# Patient Record
Sex: MALE | Race: OTHER | HISPANIC OR LATINO | URBAN - METROPOLITAN AREA
[De-identification: names, ages, dates, MRNs, and addresses within clinical notes are randomized per-mention and may not be internally consistent; named-entity substitution may affect disease eponyms.]

---

## 2018-01-28 ENCOUNTER — EMERGENCY (EMERGENCY)
Facility: HOSPITAL | Age: 43
LOS: 1 days | Discharge: HOME | End: 2018-01-28
Attending: EMERGENCY MEDICINE | Admitting: EMERGENCY MEDICINE
Payer: COMMERCIAL

## 2018-01-28 VITALS
HEART RATE: 88 BPM | RESPIRATION RATE: 16 BRPM | DIASTOLIC BLOOD PRESSURE: 78 MMHG | OXYGEN SATURATION: 100 % | SYSTOLIC BLOOD PRESSURE: 145 MMHG

## 2018-01-28 VITALS
OXYGEN SATURATION: 97 % | HEIGHT: 66 IN | WEIGHT: 149.91 LBS | TEMPERATURE: 99 F | RESPIRATION RATE: 20 BRPM | SYSTOLIC BLOOD PRESSURE: 189 MMHG | HEART RATE: 148 BPM | DIASTOLIC BLOOD PRESSURE: 74 MMHG

## 2018-01-28 LAB
ALBUMIN SERPL ELPH-MCNC: 4.6 G/DL — SIGNIFICANT CHANGE UP (ref 3.3–5.2)
ALP SERPL-CCNC: 67 U/L — SIGNIFICANT CHANGE UP (ref 40–120)
ALT FLD-CCNC: 28 U/L — SIGNIFICANT CHANGE UP
AMPHET UR-MCNC: POSITIVE
ANION GAP SERPL CALC-SCNC: 20 MMOL/L — HIGH (ref 5–17)
APTT BLD: 28.7 SEC — SIGNIFICANT CHANGE UP (ref 27.5–37.4)
AST SERPL-CCNC: 24 U/L — SIGNIFICANT CHANGE UP
BARBITURATES UR SCN-MCNC: NEGATIVE — SIGNIFICANT CHANGE UP
BASOPHILS # BLD AUTO: 0 K/UL — SIGNIFICANT CHANGE UP (ref 0–0.2)
BASOPHILS NFR BLD AUTO: 0.2 % — SIGNIFICANT CHANGE UP (ref 0–2)
BENZODIAZ UR-MCNC: NEGATIVE — SIGNIFICANT CHANGE UP
BILIRUB SERPL-MCNC: 0.4 MG/DL — SIGNIFICANT CHANGE UP (ref 0.4–2)
BUN SERPL-MCNC: 16 MG/DL — SIGNIFICANT CHANGE UP (ref 8–20)
CALCIUM SERPL-MCNC: 9.1 MG/DL — SIGNIFICANT CHANGE UP (ref 8.6–10.2)
CHLORIDE SERPL-SCNC: 97 MMOL/L — LOW (ref 98–107)
CK MB CFR SERPL CALC: 3.9 NG/ML — SIGNIFICANT CHANGE UP (ref 0–6.7)
CK SERPL-CCNC: 319 U/L — HIGH (ref 30–200)
CO2 SERPL-SCNC: 22 MMOL/L — SIGNIFICANT CHANGE UP (ref 22–29)
COCAINE METAB.OTHER UR-MCNC: NEGATIVE — SIGNIFICANT CHANGE UP
CREAT SERPL-MCNC: 0.87 MG/DL — SIGNIFICANT CHANGE UP (ref 0.5–1.3)
EOSINOPHIL # BLD AUTO: 0 K/UL — SIGNIFICANT CHANGE UP (ref 0–0.5)
EOSINOPHIL NFR BLD AUTO: 0 % — SIGNIFICANT CHANGE UP (ref 0–5)
GLUCOSE SERPL-MCNC: 145 MG/DL — HIGH (ref 70–115)
HCT VFR BLD CALC: 41.1 % — LOW (ref 42–52)
HGB BLD-MCNC: 14 G/DL — SIGNIFICANT CHANGE UP (ref 14–18)
INR BLD: 1.12 RATIO — SIGNIFICANT CHANGE UP (ref 0.88–1.16)
LYMPHOCYTES # BLD AUTO: 1.5 K/UL — SIGNIFICANT CHANGE UP (ref 1–4.8)
LYMPHOCYTES # BLD AUTO: 11.8 % — LOW (ref 20–55)
MAGNESIUM SERPL-MCNC: 2.2 MG/DL — SIGNIFICANT CHANGE UP (ref 1.6–2.6)
MCHC RBC-ENTMCNC: 31.6 PG — HIGH (ref 27–31)
MCHC RBC-ENTMCNC: 34.1 G/DL — SIGNIFICANT CHANGE UP (ref 32–36)
MCV RBC AUTO: 92.8 FL — SIGNIFICANT CHANGE UP (ref 80–94)
METHADONE UR-MCNC: NEGATIVE — SIGNIFICANT CHANGE UP
MONOCYTES # BLD AUTO: 1.2 K/UL — HIGH (ref 0–0.8)
MONOCYTES NFR BLD AUTO: 9 % — SIGNIFICANT CHANGE UP (ref 3–10)
NEUTROPHILS # BLD AUTO: 10.3 K/UL — HIGH (ref 1.8–8)
NEUTROPHILS NFR BLD AUTO: 78.7 % — HIGH (ref 37–73)
OPIATES UR-MCNC: NEGATIVE — SIGNIFICANT CHANGE UP
PCP SPEC-MCNC: SIGNIFICANT CHANGE UP
PCP UR-MCNC: NEGATIVE — SIGNIFICANT CHANGE UP
PHOSPHATE SERPL-MCNC: 3.6 MG/DL — SIGNIFICANT CHANGE UP (ref 2.4–4.7)
PLATELET # BLD AUTO: 292 K/UL — SIGNIFICANT CHANGE UP (ref 150–400)
POTASSIUM SERPL-MCNC: 3.4 MMOL/L — LOW (ref 3.5–5.3)
POTASSIUM SERPL-SCNC: 3.4 MMOL/L — LOW (ref 3.5–5.3)
PROT SERPL-MCNC: 7.9 G/DL — SIGNIFICANT CHANGE UP (ref 6.6–8.7)
PROTHROM AB SERPL-ACNC: 12.4 SEC — SIGNIFICANT CHANGE UP (ref 9.8–12.7)
RBC # BLD: 4.43 M/UL — LOW (ref 4.6–6.2)
RBC # FLD: 13.2 % — SIGNIFICANT CHANGE UP (ref 11–15.6)
SODIUM SERPL-SCNC: 139 MMOL/L — SIGNIFICANT CHANGE UP (ref 135–145)
THC UR QL: POSITIVE
TROPONIN T SERPL-MCNC: <0.01 NG/ML — SIGNIFICANT CHANGE UP (ref 0–0.06)
WBC # BLD: 13.1 K/UL — HIGH (ref 4.8–10.8)
WBC # FLD AUTO: 13.1 K/UL — HIGH (ref 4.8–10.8)

## 2018-01-28 PROCEDURE — 82550 ASSAY OF CK (CPK): CPT

## 2018-01-28 PROCEDURE — 93005 ELECTROCARDIOGRAM TRACING: CPT

## 2018-01-28 PROCEDURE — 85027 COMPLETE CBC AUTOMATED: CPT

## 2018-01-28 PROCEDURE — 36415 COLL VENOUS BLD VENIPUNCTURE: CPT

## 2018-01-28 PROCEDURE — 99291 CRITICAL CARE FIRST HOUR: CPT | Mod: 25

## 2018-01-28 PROCEDURE — 85610 PROTHROMBIN TIME: CPT

## 2018-01-28 PROCEDURE — 93010 ELECTROCARDIOGRAM REPORT: CPT

## 2018-01-28 PROCEDURE — 99291 CRITICAL CARE FIRST HOUR: CPT

## 2018-01-28 PROCEDURE — 85730 THROMBOPLASTIN TIME PARTIAL: CPT

## 2018-01-28 PROCEDURE — 82553 CREATINE MB FRACTION: CPT

## 2018-01-28 PROCEDURE — 80307 DRUG TEST PRSMV CHEM ANLYZR: CPT

## 2018-01-28 PROCEDURE — 84484 ASSAY OF TROPONIN QUANT: CPT

## 2018-01-28 PROCEDURE — 83735 ASSAY OF MAGNESIUM: CPT

## 2018-01-28 PROCEDURE — 80053 COMPREHEN METABOLIC PANEL: CPT

## 2018-01-28 PROCEDURE — 96374 THER/PROPH/DIAG INJ IV PUSH: CPT

## 2018-01-28 PROCEDURE — 84100 ASSAY OF PHOSPHORUS: CPT

## 2018-01-28 PROCEDURE — 96375 TX/PRO/DX INJ NEW DRUG ADDON: CPT

## 2018-01-28 RX ORDER — ADENOSINE 3 MG/ML
6 INJECTION INTRAVENOUS ONCE
Qty: 0 | Refills: 0 | Status: COMPLETED | OUTPATIENT
Start: 2018-01-28 | End: 2018-01-28

## 2018-01-28 RX ORDER — ADENOSINE 3 MG/ML
12 INJECTION INTRAVENOUS ONCE
Qty: 0 | Refills: 0 | Status: COMPLETED | OUTPATIENT
Start: 2018-01-28 | End: 2018-01-28

## 2018-01-28 RX ORDER — AMIODARONE HYDROCHLORIDE 400 MG/1
150 TABLET ORAL ONCE
Qty: 0 | Refills: 0 | Status: COMPLETED | OUTPATIENT
Start: 2018-01-28 | End: 2018-01-28

## 2018-01-28 RX ADMIN — AMIODARONE HYDROCHLORIDE 618 MILLIGRAM(S): 400 TABLET ORAL at 09:27

## 2018-01-28 RX ADMIN — ADENOSINE 6 MILLIGRAM(S): 3 INJECTION INTRAVENOUS at 09:23

## 2018-01-28 RX ADMIN — ADENOSINE 6 MILLIGRAM(S): 3 INJECTION INTRAVENOUS at 09:27

## 2018-01-28 RX ADMIN — ADENOSINE 6 MILLIGRAM(S): 3 INJECTION INTRAVENOUS at 09:24

## 2018-01-28 NOTE — ED PROVIDER NOTE - NS ED ROS FT
Denies fever, chills, HA, blurry vision, sore throat, coughing, vomiting, abdominal pain, flank pain, diarrhea, constipation, blood in stool, urinary frequency/urgency/dysuria, hematuria, LE edema, numbness, weakness or rashes.

## 2018-01-28 NOTE — ED PROVIDER NOTE - PROGRESS NOTE DETAILS
Patient reassessed and results shared with him and his wife. He states he wants to leave and that "I can't afford to stay here any longer." I explained that we do not charge by the hour & I agreed for no more tests. I encouraged PO hydration s/p 2L IVF, HR decreased to 110, but increases to 120 when I walk into the room and therefore there is likely an anxiety component. Patient agrees for further observation until HR improves below 100. Patient tolerated PO well, feels well, HR improved to 90, BP remains stable and RR and O2 saturation are maintained WNL. Will discharge patient. He verbalizes understanding regarding indications to return to the ED and the importance of proper follow up. He is comfortable with discharge at this time.

## 2018-01-28 NOTE — ED ADULT NURSE NOTE - OBJECTIVE STATEMENT
Patient arrived to ED today with c/o chest pressure, chest pain that started this AM.  Patient states he used pot, drank alcohol, and another drug last night that he is unsure of.

## 2018-01-28 NOTE — ED PROVIDER NOTE - MEDICAL DECISION MAKING DETAILS
Patient presents for tachycardia, in moderate distress, sinus tach vs. SVT noted on the monitor. Two attempts at valsalva did not slow rate. Adenosine given with slowing of HR, but tachycardia returned, albeit slower, showing sinus tachycardia. EKG's obtained. 150 mg of Amiodarone given and IV hydration given. No beta-blocker given as patient ingested unknown substance and in the event it was cocaine, beta-blocker would allow for un-opposed alpha agonism and patient may decompensate. Pads on patient, discussed plan for IV hydration and reassessment. Cardiac enzymes and drug screens sent. Patient presents for tachycardia, in moderate distress, sinus tach vs. SVT noted on the monitor. Two attempts at valsalva did not slow rate. Adenosine given with slowing of HR, but tachycardia returned, albeit slower, showing sinus tachycardia. EKG's obtained. 150 mg of Amiodarone given and IV hydration given. No beta-blocker given as patient ingested unknown substance and in the event it was cocaine, beta-blocker would allow for un-opposed alpha agonism and patient may decompensate. Pads on patient, discussed plan for IV hydration and reassessment. Cardiac enzymes and drug screens sent. Patient's symptoms improved with IV hydration and time. He is asymptomatic after treatment and tolerated PO. He is comfortable with discharge and verbalizes understanding regarding drug use.
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
## 2018-01-28 NOTE — ED PROVIDER NOTE - CARE PLAN
Principal Discharge DX:	Sinus tachycardia Principal Discharge DX:	Sinus tachycardia  Secondary Diagnosis:	Polysubstance abuse

## 2018-01-28 NOTE — ED PROVIDER NOTE - CROS ED NEURO POS
FREE:[LAST:[[place]],PHONE:[(   )    -],FAX:[(   )    -],ADDRESS:[PMD]] + dizziness TOKEN:'[id number]'

## 2018-09-13 ENCOUNTER — EMERGENCY (EMERGENCY)
Facility: HOSPITAL | Age: 43
LOS: 1 days | Discharge: HOME | End: 2018-09-13
Attending: EMERGENCY MEDICINE
Payer: COMMERCIAL

## 2018-09-13 VITALS
OXYGEN SATURATION: 97 % | HEART RATE: 88 BPM | TEMPERATURE: 98 F | SYSTOLIC BLOOD PRESSURE: 143 MMHG | DIASTOLIC BLOOD PRESSURE: 71 MMHG | RESPIRATION RATE: 14 BRPM

## 2018-09-13 VITALS
RESPIRATION RATE: 14 BRPM | TEMPERATURE: 98 F | HEART RATE: 78 BPM | SYSTOLIC BLOOD PRESSURE: 150 MMHG | DIASTOLIC BLOOD PRESSURE: 80 MMHG | HEIGHT: 65 IN | WEIGHT: 149.91 LBS | OXYGEN SATURATION: 100 %

## 2018-09-13 PROBLEM — I10 ESSENTIAL (PRIMARY) HYPERTENSION: Chronic | Status: ACTIVE | Noted: 2018-01-28

## 2018-09-13 PROCEDURE — 72125 CT NECK SPINE W/O DYE: CPT | Mod: 26

## 2018-09-13 PROCEDURE — 73030 X-RAY EXAM OF SHOULDER: CPT | Mod: 26,RT

## 2018-09-13 PROCEDURE — 72170 X-RAY EXAM OF PELVIS: CPT | Mod: 26

## 2018-09-13 PROCEDURE — 99284 EMERGENCY DEPT VISIT MOD MDM: CPT

## 2018-09-13 PROCEDURE — 73030 X-RAY EXAM OF SHOULDER: CPT

## 2018-09-13 PROCEDURE — 72170 X-RAY EXAM OF PELVIS: CPT

## 2018-09-13 PROCEDURE — 71101 X-RAY EXAM UNILAT RIBS/CHEST: CPT

## 2018-09-13 PROCEDURE — 72125 CT NECK SPINE W/O DYE: CPT

## 2018-09-13 PROCEDURE — 71101 X-RAY EXAM UNILAT RIBS/CHEST: CPT | Mod: 26

## 2018-09-13 RX ORDER — IBUPROFEN 200 MG
600 TABLET ORAL ONCE
Qty: 0 | Refills: 0 | Status: COMPLETED | OUTPATIENT
Start: 2018-09-13 | End: 2018-09-13

## 2018-09-13 RX ADMIN — Medication 600 MILLIGRAM(S): at 11:14

## 2018-09-13 RX ADMIN — Medication 600 MILLIGRAM(S): at 11:43
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
## 2018-09-13 NOTE — ED ADULT NURSE NOTE - NSIMPLEMENTINTERV_GEN_ALL_ED
Implemented All Universal Safety Interventions:  [place] to call system. Call bell, personal items and telephone within reach. Instruct patient to call for assistance. Room bathroom lighting operational. Non-slip footwear when patient is off stretcher. Physically safe environment: no spills, clutter or unnecessary equipment. Stretcher in lowest position, wheels locked, appropriate side rails in place.
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
## 2018-09-13 NOTE — ED PROVIDER NOTE - PHYSICAL EXAMINATION
Gen: WNWD NAD  HEENT: NCAT PERRL EOMI normal pharynx  Neck: supple no midline TTP, BL upper trapezius spasm and TTP  CV: RRR, no murmur  Chest: L lateral and posterior chest wall TTP no crepitus  Lung: CTA BL  Abd: +BS soft NTND  Ext: wwp, palp pulses, FROMx4 but L hip pain with flexion, no cce  Neuro: A&Ox3, CN grossly intact, sensation intact, motor 5/5 throughout
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
## 2018-11-24 ENCOUNTER — EMERGENCY (EMERGENCY)
Facility: HOSPITAL | Age: 43
LOS: 1 days | Discharge: HOME | End: 2018-11-24
Attending: EMERGENCY MEDICINE | Admitting: EMERGENCY MEDICINE
Payer: SELF-PAY

## 2018-11-24 VITALS
TEMPERATURE: 98 F | DIASTOLIC BLOOD PRESSURE: 108 MMHG | HEART RATE: 85 BPM | RESPIRATION RATE: 17 BRPM | WEIGHT: 149.91 LBS | OXYGEN SATURATION: 99 % | HEIGHT: 65 IN | SYSTOLIC BLOOD PRESSURE: 148 MMHG

## 2018-11-24 DIAGNOSIS — R07.89 OTHER CHEST PAIN: ICD-10-CM

## 2018-11-24 LAB
ALBUMIN SERPL ELPH-MCNC: 3.9 G/DL — SIGNIFICANT CHANGE UP (ref 3.3–5)
ALP SERPL-CCNC: 90 U/L — SIGNIFICANT CHANGE UP (ref 40–120)
ALT FLD-CCNC: 38 U/L DA — SIGNIFICANT CHANGE UP (ref 10–45)
ANION GAP SERPL CALC-SCNC: 10 MMOL/L — SIGNIFICANT CHANGE UP (ref 5–17)
AST SERPL-CCNC: 22 U/L — SIGNIFICANT CHANGE UP (ref 10–40)
BASOPHILS # BLD AUTO: 0.1 K/UL — SIGNIFICANT CHANGE UP (ref 0–0.2)
BASOPHILS NFR BLD AUTO: 1.6 % — SIGNIFICANT CHANGE UP (ref 0–2)
BILIRUB SERPL-MCNC: 0.3 MG/DL — SIGNIFICANT CHANGE UP (ref 0.2–1.2)
BUN SERPL-MCNC: 21 MG/DL — SIGNIFICANT CHANGE UP (ref 7–23)
CALCIUM SERPL-MCNC: 9.1 MG/DL — SIGNIFICANT CHANGE UP (ref 8.4–10.5)
CHLORIDE SERPL-SCNC: 102 MMOL/L — SIGNIFICANT CHANGE UP (ref 96–108)
CO2 SERPL-SCNC: 28 MMOL/L — SIGNIFICANT CHANGE UP (ref 22–31)
CREAT SERPL-MCNC: 1.02 MG/DL — SIGNIFICANT CHANGE UP (ref 0.5–1.3)
EOSINOPHIL # BLD AUTO: 0.3 K/UL — SIGNIFICANT CHANGE UP (ref 0–0.5)
EOSINOPHIL NFR BLD AUTO: 4.2 % — SIGNIFICANT CHANGE UP (ref 0–6)
GLUCOSE SERPL-MCNC: 113 MG/DL — HIGH (ref 70–99)
HCT VFR BLD CALC: 44 % — SIGNIFICANT CHANGE UP (ref 39–50)
HGB BLD-MCNC: 14.8 G/DL — SIGNIFICANT CHANGE UP (ref 13–17)
LYMPHOCYTES # BLD AUTO: 1.9 K/UL — SIGNIFICANT CHANGE UP (ref 1–3.3)
LYMPHOCYTES # BLD AUTO: 30.3 % — SIGNIFICANT CHANGE UP (ref 13–44)
MCHC RBC-ENTMCNC: 32.2 PG — SIGNIFICANT CHANGE UP (ref 27–34)
MCHC RBC-ENTMCNC: 33.8 GM/DL — SIGNIFICANT CHANGE UP (ref 32–36)
MCV RBC AUTO: 95.3 FL — SIGNIFICANT CHANGE UP (ref 80–100)
MONOCYTES # BLD AUTO: 0.7 K/UL — SIGNIFICANT CHANGE UP (ref 0–0.9)
MONOCYTES NFR BLD AUTO: 10.7 % — HIGH (ref 1–9)
NEUTROPHILS # BLD AUTO: 3.4 K/UL — SIGNIFICANT CHANGE UP (ref 1.8–7.4)
NEUTROPHILS NFR BLD AUTO: 53.2 % — SIGNIFICANT CHANGE UP (ref 43–77)
PLATELET # BLD AUTO: 299 K/UL — SIGNIFICANT CHANGE UP (ref 150–400)
POTASSIUM SERPL-MCNC: 4.4 MMOL/L — SIGNIFICANT CHANGE UP (ref 3.5–5.3)
POTASSIUM SERPL-SCNC: 4.4 MMOL/L — SIGNIFICANT CHANGE UP (ref 3.5–5.3)
PROT SERPL-MCNC: 7.8 G/DL — SIGNIFICANT CHANGE UP (ref 6–8.3)
RBC # BLD: 4.61 M/UL — SIGNIFICANT CHANGE UP (ref 4.2–5.8)
RBC # FLD: 11.6 % — SIGNIFICANT CHANGE UP (ref 10.3–14.5)
SODIUM SERPL-SCNC: 140 MMOL/L — SIGNIFICANT CHANGE UP (ref 135–145)
TROPONIN I SERPL-MCNC: <.017 NG/ML — LOW (ref 0.02–0.06)
TROPONIN I SERPL-MCNC: <.017 NG/ML — LOW (ref 0.02–0.06)
WBC # BLD: 6.3 K/UL — SIGNIFICANT CHANGE UP (ref 3.8–10.5)
WBC # FLD AUTO: 6.3 K/UL — SIGNIFICANT CHANGE UP (ref 3.8–10.5)

## 2018-11-24 PROCEDURE — 99285 EMERGENCY DEPT VISIT HI MDM: CPT

## 2018-11-24 PROCEDURE — 71045 X-RAY EXAM CHEST 1 VIEW: CPT | Mod: 26

## 2018-11-24 PROCEDURE — 85027 COMPLETE CBC AUTOMATED: CPT

## 2018-11-24 PROCEDURE — 93005 ELECTROCARDIOGRAM TRACING: CPT

## 2018-11-24 PROCEDURE — 71045 X-RAY EXAM CHEST 1 VIEW: CPT

## 2018-11-24 PROCEDURE — 99283 EMERGENCY DEPT VISIT LOW MDM: CPT | Mod: 25

## 2018-11-24 PROCEDURE — 93010 ELECTROCARDIOGRAM REPORT: CPT

## 2018-11-24 PROCEDURE — 80053 COMPREHEN METABOLIC PANEL: CPT

## 2018-11-24 PROCEDURE — 84484 ASSAY OF TROPONIN QUANT: CPT

## 2018-11-24 NOTE — ED PROVIDER NOTE - OBJECTIVE STATEMENT
Pertinent PMH/PSH/FHx/SHx and Review of Systems contained within:  42 y/o male with h/o HTN presents to ed c/o left anterior chest pain . pressure like , started 45 minutes ago, while he was cooking

## 2018-11-24 NOTE — ED PROVIDER NOTE - MEDICAL DECISION MAKING DETAILS
pt has non specific chest pain, normal ekg, no risk factor for CAD, 2 set negative,, out pt follow up

## 2018-11-25 VITALS
RESPIRATION RATE: 18 BRPM | HEART RATE: 62 BPM | OXYGEN SATURATION: 99 % | SYSTOLIC BLOOD PRESSURE: 131 MMHG | DIASTOLIC BLOOD PRESSURE: 65 MMHG | TEMPERATURE: 98 F

## 2018-11-25 PROBLEM — I10 ESSENTIAL (PRIMARY) HYPERTENSION: Chronic | Status: ACTIVE | Noted: 2018-09-13

## 2018-11-25 PROBLEM — N40.0 BENIGN PROSTATIC HYPERPLASIA WITHOUT LOWER URINARY TRACT SYMPTOMS: Chronic | Status: ACTIVE | Noted: 2018-09-13

## 2018-12-05 NOTE — ED ADULT NURSE NOTE - CAS ELECT INFOMATION PROVIDED
Telephone Encounter by Tania Champion at 03/16/18 03:01 PM     Author:  Tania Champion Service:  (none) Author Type:  Patient      Filed:  03/16/18 03:05 PM Encounter Date:  3/16/2018 Status:  Signed     :  Tania Champion (Patient )              MONIKA LOPES    Patient Age: 78 year old    ACCT STATUS:   MESSAGE:[NP1.1T]   The patient's wife Debi Voss would like to speak to clinical regarding the increase in his warfarin and concerns they have.  The patient is scheduled to be see in the [facility] on 3/21, but would like to speak to  clinical prior to that visit.[NP1.1M]   Next and Last Visit with Provider and Department  Next visit with BRIDGER RAYGOZA is on No match found  Next visit with [facility] is on No match found  Last visit with BRIDGER RAYGOZA was on 03/15/2018 at  2:40 PM in [facility]  Last visit with [facility] was on 03/15/2018 at  2:40 PM in [facility]     WEIGHT AND HEIGHT: As of 03/15/2018 weight is 180 lbs.(81.647 kg). Height is 5' 8\"(1.727 m).   BMI is 27.38 kg/(m^2) calculated from:     Height 5' 8\" (1.727 m) as of 3/15/18     Weight 180 lb (81.647 kg) as of 3/15/18[NP1.1T]      Allergies      Allergen   Reactions   • Ceclor [Cefaclor]  Rash   • Penicillin G  Rash     Per pt he stated they tested him and was allergic to PCN[NP1.2T]      Current outpatient prescriptions       Medication  Sig Dispense Refill   • Mirabegron ER (MYRBETRIQ) 25 MG TB24 Take 25 mg by mouth daily.     • Olopatadine HCl (PATADAY) 0.2 % SOLN Apply  in the eye(s).     • omeprazole (PRILOSEC) 20 MG Cap Take 1 Cap by mouth daily. 90 Cap 2   • levothyroxine (SYNTHROID, LEVOTHROID) 100 MCG tablet Take 1 Tab by mouth daily. 90 Tab 0   • warfarin (COUMADIN) 5 MG tablet Take 2.5 mg by mouth daily.     • warfarin (COUMADIN) 1 MG tablet Take 0.5 mg by mouth twice a week.     • lisinopril (PRINIVIL,ZESTRIL) 10 MG tablet Take 20 mg by mouth daily.     •  Travoprost, ANNIE Free, (TRAVATAN Z) 0.004 % SOLN Apply  in the eye(s).     • Ascorbic Acid (VITAMIN C) 500 MG tablet Take 500 mg by mouth daily.     • Calcium Carbonate-Vitamin D (CALCIUM 600 + D OR) Take  by mouth.     • Ergocalciferol (VITAMIN D OR) Take  by mouth.     • Multiple Vitamin (MULTIVITAMINS OR) Take  by mouth.     • aspirin 81 MG tablet Take 81 mg by mouth daily.        PHARMACY to use:[NP1.1T] TBD[NP1.3M]          Pharmacy preference(s) on file:    [facility] [address]  [facility]    CALL BACK INFO:[NP1.1T] Ok to leave response (including medical information) with family member or on answering machine[NP1.3M]  ROUTING:[NP1.1T] OK to hold message for return of patient's physician. Pt aware that physician is out of the office.[NP1.3M]        PCP: Flavio Lees MD         INS: Payor: MEDICARE - ASSIGNED / Plan: *No Plan* / Product Type: *No Product type* / Note: This is the primary coverage, but no account was found for this location or the patient's primary location.   ADDRESS:  [address] [place] [id number][NP1.1T]         Revision History        User Key Date/Time User Provider Type Action    > NP1.3 03/16/18 03:05 PM Tania Champion Patient  Sign     NP1.2 03/16/18 03:02 PM Tania Champion Patient       NP1.1 03/16/18 03:01 PM Tania Champion Patient      M - Manual, T - Template             DC instructions

## 2019-11-12 ENCOUNTER — EMERGENCY (EMERGENCY)
Facility: HOSPITAL | Age: 44
LOS: 1 days | Discharge: HOME | End: 2019-11-12
Attending: INTERNAL MEDICINE | Admitting: INTERNAL MEDICINE
Payer: SELF-PAY

## 2019-11-12 VITALS
DIASTOLIC BLOOD PRESSURE: 75 MMHG | HEIGHT: 65 IN | WEIGHT: 149.91 LBS | OXYGEN SATURATION: 98 % | SYSTOLIC BLOOD PRESSURE: 141 MMHG | TEMPERATURE: 98 F | HEART RATE: 86 BPM | RESPIRATION RATE: 16 BRPM

## 2019-11-12 DIAGNOSIS — H57.10 OCULAR PAIN, UNSPECIFIED EYE: ICD-10-CM

## 2019-11-12 PROCEDURE — 99283 EMERGENCY DEPT VISIT LOW MDM: CPT

## 2019-11-12 RX ORDER — TAMSULOSIN HYDROCHLORIDE 0.4 MG/1
1 CAPSULE ORAL
Qty: 0 | Refills: 0 | DISCHARGE

## 2019-11-12 RX ORDER — TAMSULOSIN HYDROCHLORIDE 0.4 MG/1
0 CAPSULE ORAL
Qty: 0 | Refills: 0 | DISCHARGE

## 2019-11-12 RX ORDER — TOBRAMYCIN 0.3 %
2 DROPS OPHTHALMIC (EYE) ONCE
Refills: 0 | Status: COMPLETED | OUTPATIENT
Start: 2019-11-12 | End: 2019-11-12

## 2019-11-12 RX ADMIN — Medication 2 DROP(S): at 18:17

## 2019-11-12 NOTE — ED PROVIDER NOTE - CLINICAL SUMMARY MEDICAL DECISION MAKING FREE TEXT BOX
L eye exposure to house hold bleach with contacts on , on exam no corneal abrasion, erythematous conjunctiva , ph initial 6, after 1 l ns irrigation it was 7   d/w poison control fellow recommended opthalmology follow up no contacts for 1 week

## 2019-11-12 NOTE — ED PROVIDER NOTE - CARE PROVIDER_API CALL
Serge Means)  Ophthalmology  [address] [place] [id number]  Phone: [phone]  Fax: [phone]  Follow Up Time:

## 2019-11-12 NOTE — ED PROVIDER NOTE - PATIENT PORTAL LINK FT
You can access the FollowMyHealth Patient Portal offered by [facility] by registering at the following website: [email or web address]. By joining [employer]’s FollowMyHealth portal, you will also be able to view your health information using other applications (apps) compatible with our system.
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
## 2019-11-12 NOTE — ED ADULT NURSE NOTE - OBJECTIVE STATEMENT
pt got bleach in his eye last night, was wearing contact lenses. Eye reddened and painful. no drainage.

## 2019-11-12 NOTE — ED ADULT TRIAGE NOTE - CHIEF COMPLAINT QUOTE
Pt stated splashed bleach in left eye last night, c/o eye pain, vision test with glasses both eyes 20/20, right 20/25, left 20/20

## 2020-12-08 NOTE — ED ADULT NURSE NOTE - VEHICLE
SUBJECTIVE:   Jennyfer Sandoval is a 55 year old female No obstetric history on file.   for annual well woman exam.   No LMP recorded. Patient has had a hysterectomy.     Menstrual history: s/p NHI  GYN History:Pap History:normal    Date of last mammogram: 12 months ago  Result of mammogram: Normal    Date of DEXA: never  Result of DEXA Scan: NA    Date of last Colonoscopy:UTD   Result of Colonoscopy: normal      Does patient exercise? yes   How many times per week? 3    Was counseling given: yes     Does patient desire TDAP vaccine today: No    Depression Screening:  Over the past 2 weeks, has patient felt down, depressed or hopeless? no  Over the past 2 weeks, has patient felt little interest or pleasure in doing things? no    On the basis of the above screen, the following is initiated:  Cont monitoring    Social History:   Social History     Socioeconomic History   • Marital status: /Civil Union     Spouse name: Not on file   • Number of children: Not on file   • Years of education: Not on file   • Highest education level: Not on file   Occupational History   • Not on file   Social Needs   • Financial resource strain: Not on file   • Food insecurity     Worry: Not on file     Inability: Not on file   • Transportation needs     Medical: Not on file     Non-medical: Not on file   Tobacco Use   • Smoking status: Former Smoker     Types: Cigarettes     Quit date: 2018     Years since quittin.9   • Smokeless tobacco: Never Used   Substance and Sexual Activity   • Alcohol use: Not Currently     Frequency: Never     Comment: rarely    • Drug use: No   • Sexual activity: Not on file   Lifestyle   • Physical activity     Days per week: 0 days     Minutes per session: 0 min   • Stress: Only a little   Relationships   • Social connections     Talks on phone: Not on file     Gets together: Not on file     Attends [employer] service: Not on file     Active member of club or organization: Not on file     Attends  meetings of clubs or organizations: Not on file     Relationship status: Not on file   • Intimate partner violence     Fear of current or ex partner: Not on file     Emotionally abused: Not on file     Physically abused: Not on file     Forced sexual activity: Not on file   Other Topics Concern   • Not on file   Social History Narrative   • Not on file     Past Medical History:   Diagnosis Date   • Anxiety    • Hypertension    • Hypothyroid      Past Surgical History:   Procedure Laterality Date   • Total knee replacement Right 2017     Family History:   Family History   Problem Relation Age of Onset   • Hypertension Mother    • Heart disease Father        Allergies:   ALLERGIES:   Allergen Reactions   • Cephalosporins Other (See Comments)   • Corticosteroids HEADACHES   • Penicillins Other (See Comments) and RASH     COULDN'T MOVE ARMS OR LEGS     • Tetracycline Other (See Comments)       Current Outpatient Medications   Medication Sig Dispense Refill   • estradiol (VIVELLE-DOT) 0.05 MG/24HR twice weekly patch Place 1 patch onto the skin 2 days a week. 24 patch 3   • gabapentin (NEURONTIN) 400 MG capsule Take 2 capsules by mouth twice daily 360 capsule 0   • NIFEdipine CC (ADALAT CC) 60 MG 24 hr tablet Take 1 tablet by mouth once daily 30 tablet 0   • venlafaxine XR (EFFEXOR XR) 75 MG 24 hr capsule Take 2 capsules by mouth once daily 60 capsule 0   • traZODone (DESYREL) 100 MG tablet TAKE 2 TABLETS BY MOUTH ONCE DAILY AT BEDTIME 60 tablet 0   • cholecalciferol (VITAMIN D) 25 mcg (1,000 units) tablet Take by mouth daily.     • magnesium 250 MG tablet Take 1 tablet by mouth 2 (two) times a day. 30 tablet 0   • clindamycin (CLEOCIN) 300 MG capsule Take 1 capsule by mouth 2 times daily. 14 capsule 0   • diphenhydrAMINE-APAP, sleep, (TYLENOL PM EXTRA STRENGTH PO)        No current facility-administered medications for this visit.          ROS   No headaches, no unexplained chest pain, dyspnea, SOB, abdominal pain, bowel  or bladder complaints.  All other systems reviewed are negative.    GYNE ROS:   Genitourinary: denies urgency, frequency, dysuria, incontinenceVaginal symptoms: none  Discharge described as: normal and physiologic.  Other associated symptoms: yes  doing well on HRT desires to cont     All other systems reviewed are negative    PREVENTATIVE MEDICINE:    Does patient desire Immunizations: no  Last Lipids: follows with PCP   OBJECTIVE  Physical Exam  There were no vitals filed for this visit.    Jennyfer's BMI is There is no height or weight on file to calculate BMI.,        CONSTITUTIONAL:    Appearance: well-nourished, well developed, alert, in no acute distress    HENT   Head: normocephalic, atraumatic   Face: face within normal limits, no sinus tenderness on palpation, no hirsutism present, parotid glands within normal limits   Ears: external ears within normal limits   Nose: external nose normal in appearance, nares patent, nasal discharge absent   Mouth: appearance normal      CHEST   Respiratory effort: breathing unlabored   Auscultation: normal breath sounds, no rales, no rhonchi    CARDIOVASCULAR   Heart: regular rate, normal rhythm, no murmurs present    BREASTS   Inspection of Breasts: breasts symmetrical, no skin changes, no discharge present   Palpation of Breasts and Axillae: no masses present on palpation, no breast tenderness   Axillary Lymph Nodes: no lymphadenopathy present    GASTROINTESTINAL   Abdominal Examination: abdomen nontender to palpation, normal bowel sounds, tone normal without rigidity or guarding, no masses present, umbilicus without lesions   Liver and Spleen: no hepatomegaly present, liver nontender to palpation   Hernias: no hernias present    GENITOURINARY   External Genitalia: normal appearance for age, no discharge present, no tenderness present, no inflammatory lesions present   Vagina: normal vaginal vault without central or paravaginal defects, no discharge present, no inflammatory  lesions present, no masses present   Bladder: nontender to palpation   Urethral body: urethra palpation normal, urethra structural support normal   Cervix: absent   Uterus: absent   Adnexa: no adnexal tenderness present, no adnexal masses present   Perineum: perineum within normal limits, no evidence of trauma, no rashes or skin lesions present   Anus: anus within normal limits, no hemorrhoids present   Inguinal Lymph Nodes: no lymphadenopathy present        LYMPHATIC   Lymph Nodes: no other lymphadenopathy present    SKIN   General Inspection: no rashes present, no lesions present, no areas of discoloration    NEUROLOGIC/PSYCHIATRIC   Orientation: grossly oriented to person, place and time   Judgment and Insight: judgment and insight intact   Mood and Affect: mood normal, affect appropriate    ASSESSMENT  Annual exam     PLAN  Recommended calcium fortified diet of at least 3 servings a day, Cardiovascular health being followed by PCP, Dexa scan not indicated, Recommended mammograms yearly, Refill medications and Return for annual GYN exam in one year or earlier with any additional concerns. HRT use and risks reviewed all questions answered        Patient repeated all of the instructions and states she understands the plan of care.  Vero Loja MD           car

## 2021-06-04 ENCOUNTER — EMERGENCY (EMERGENCY)
Facility: HOSPITAL | Age: 46
LOS: 1 days | Discharge: HOME | End: 2021-06-04
Attending: EMERGENCY MEDICINE | Admitting: EMERGENCY MEDICINE
Payer: SELF-PAY

## 2021-06-04 VITALS
SYSTOLIC BLOOD PRESSURE: 146 MMHG | TEMPERATURE: 100 F | WEIGHT: 149.91 LBS | RESPIRATION RATE: 16 BRPM | HEART RATE: 84 BPM | OXYGEN SATURATION: 98 % | HEIGHT: 65 IN | DIASTOLIC BLOOD PRESSURE: 83 MMHG

## 2021-06-04 PROCEDURE — 73130 X-RAY EXAM OF HAND: CPT

## 2021-06-04 PROCEDURE — 99283 EMERGENCY DEPT VISIT LOW MDM: CPT | Mod: 25

## 2021-06-04 PROCEDURE — 99283 EMERGENCY DEPT VISIT LOW MDM: CPT

## 2021-06-04 PROCEDURE — 73130 X-RAY EXAM OF HAND: CPT | Mod: 26,LT
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
## 2021-06-04 NOTE — ED PROVIDER NOTE - PATIENT PORTAL LINK FT
You can access the FollowMyHealth Patient Portal offered by [facility] by registering at the following website: [email or web address]. By joining [employer]’s FollowMyHealth portal, you will also be able to view your health information using other applications (apps) compatible with our system.

## 2021-06-04 NOTE — ED PROVIDER NOTE - NSFOLLOWUPINSTRUCTIONS_ED_ALL_ED_FT
You were seen for a laceration.     FOLLOW ALL INSTRUCTIONS GIVEN YOU ABOUT THE CARE OF YOUR LACERATION    Laceration WHAT YOU NEED TO KNOW:    A laceration is an injury to the skin and the soft tissue underneath it. Lacerations can happen anywhere on the body.    DISCHARGE INSTRUCTIONS:    Seek care immediately if:   •You have heavy bleeding or bleeding that does not stop after 10 minutes of holding firm, direct pressure over the wound.    •Your wound opens up.    Call your doctor if:   •You have a fever or chills.    •Your laceration is red, warm, or swollen.    •You have red streaks on your skin coming from your wound.    •You have white or yellow drainage from the wound that smells bad.    •You have pain that gets worse, even after treatment.    •You have questions or concerns about your condition or care.    Medicines: You may need any of the following:   •Prescription pain medicine may be given. Ask your healthcare provider how to take this medicine safely. Some prescription pain medicines contain acetaminophen. Do not take other medicines that contain acetaminophen without talking to your healthcare provider. Too much acetaminophen may cause liver damage. Prescription pain medicine may cause constipation. Ask your healthcare provider how to prevent or treat constipation.     •Antibiotics help treat or prevent a bacterial infection.    •Take your medicine as directed. Contact your healthcare provider if you think your medicine is not helping or if you have side effects. Tell him or her if you are allergic to any medicine. Keep a list of the medicines, vitamins, and herbs you take. Include the amounts, and when and why you take them. Bring the list or the pill bottles to follow-up visits. Carry your medicine list with you in case of an emergency.    Care for your wound as directed:   •Do not get your wound wet until your healthcare provider says it is okay. Do not soak your wound in water. Do not go swimming until your healthcare provider says it is okay. Carefully wash the wound with soap and water. Gently pat the area dry or allow it to air dry.    •Change your bandages when they get wet, dirty, or after washing. Apply new, clean bandages as directed. Do not apply elastic bandages or tape too tight. Do not put powders or lotions over your incision.    •Apply antibiotic ointment as directed. Your healthcare provider may give you antibiotic ointment to put over your wound if you have stitches. If you have strips of tape over your incision, let them dry up and fall off on their own. If they do not fall off within 14 days, gently remove them. If you have glue over your wound, do not remove or pick at it. If your glue comes off, do not replace it with glue that you have at home.      •Check your wound every day for signs of infection, such as swelling, redness, or pus.    Self-care:   •Apply ice on your wound for 15 to 20 minutes every hour or as directed. Use an ice pack, or put crushed ice in a plastic bag. Cover it with a towel. Ice helps prevent tissue damage and decreases swelling and pain.    •Use a splint as directed. A splint will decrease movement and stress on your wound. It may help it heal faster. A splint may be used for lacerations over joints or areas of your body that bend. Ask your healthcare provider how to apply and remove a splint.      •Decrease scarring of your wound by applying ointments as directed. Do not apply ointments until your healthcare provider says it is okay. You may need to wait until your wound is healed. Ask which ointment to buy and how often to use it. After your wound is healed, use sunscreen over the area when you are out in the sun. You should do this for at least 6 months to 1 year after your injury.      Follow up with your doctor as directed: You will need to return in 3 to 14 days to have stitches or staples removed. Write down your questions so you remember to ask them during your visits.      Laceración    LO QUE NECESITA SABER:    Alexey laceración es alexey herida que se presenta en la piel y en el tejido blando que hay debajo de rahul. Las laceraciones pueden presentarse en cualquier parte del cuerpo.    INSTRUCCIONES SOBRE EL DONA HOSPITALARIA:    Busque atención médica de inmediato si:  •Está sangrando mucho o tiene sangrado que no para después de 10 minutos de aplicar presión firme y directa sobre la herida.      •Lawler herida se abre.      Llame a lawler médico si:  •Usted tiene fiebre o escalofríos.      •Lawler laceración está enrojecida, tibia o inflamada.      •En la piel de lawler herida le salen unas rupali sanon.      •Usted tiene drenaje sher o amarillo saliendo de la herida que tiene mal olor.      •Usted tiene dolor que está empeorando después del tratamiento.      •Usted tiene preguntas o inquietudes acerca de lawler condición o cuidado.      Medicamentos:Es posible que usted necesite alguno de los siguientes:   •Puede administrarsepodrían administrarse. Pregunte al médico cómo debe jenna eri medicamento de forma ratliff. Algunos medicamentos recetados para el dolor contienen acetaminofén. No tome otros medicamentos que contengan acetaminofén sin consultarlo con lawler médico. Demasiado acetaminofeno puede causar daño al hígado. Los medicamentos recetados para el dolor podrían causar estreñimiento. Pregunte a lawler médico benita prevenir o tratar estreñimiento.      •Los antibióticosayudan a tratar o prevenir infecciones bacteriales.      •Diagonal tessy medicamentos benita se le haya indicado.Consulte con lawler médico si usted chikis que lawler medicamento no le está ayudando o si presenta efectos secundarios. Infórmele si es alérgico a cualquier medicamento. Mantenga alexey lista actualizada de los medicamentos, las vitaminas y los productos herbales que heri. Incluya los siguientes datos de los medicamentos: cantidad, frecuencia y motivo de administración. Traiga con usted la lista o los envases de las píldoras a tessy citas de seguimiento. Lleve la lista de los medicamentos con usted en kane de alexey emergencia.      Siga las instrucciones de lawler médico sobre el cuidado de tessy heridas:  •No moje la herida.hasta que lawler médico lo autorice. No sumerja lawler herida en agua. No vaya a nadar hasta que lawler médico lo autorice. Lave cuidadosamente la herida con agua y jabón. Seque el área con palmadas suaves o permita que se seque al aire.      •Cambie tesys vendascuando se mojen, estén sucios o después del lavado. Aplique un vendaje limpio según las indicaciones. No se aplique un vendaje elástico ni cinta muy apretada. No se aplique polvos ni alexey loción en lawler incisión.      •Aplique un ungüento antibiótico benita se indica.Lawler médico le puede formular un ungüento antibiótico para que se aplique sobre lawler herida en kane que tenga puntos de sutura. En kane de tener cintas o tiras sobre lawler incisión, permita que se sequen y se caigan solas. En kane que no se caigan en 14 días, retírelas con cuidado. Si usted tiene pegamento sobre lawler herida, no lo retire ni se lo moleste. Si el pegamento se , no lo reemplace con pegamento casero.      •Revise lawler herida todos los días para detectar signos de infección,benita hinchazón, enrojecimiento o pus.      Cuidados personales:  •Aplique hieloen la herida de 15 a 20 minutos cada hora o benita se le indique. Use alexey compresa de hielo o ponga hielo triturado en alexey bolsa de plástico. Cúbrala con alexey toalla. El hielo ayuda a evitar daño al tejido y a disminuir la inflamación y el dolor.      •Use alexey férula según las indicaciones.Alexey férula lo inmovilizará y disminuirá la tensión sobre la herida. Es posible que le sirva para recuperarse más rápido. Alexey férula se puede usar para alexey laceración sobre las articulaciones o las áreas de lawler cuerpo que se doblan. Pregunte a lawler médico cómo se debe colocar y retirar lawler férula.      •Disminuya la cicatrización de lawler heridaaplicando un ungüento o pomada según las indicaciones. No se aplique pomadas en la herida hasta que lawler médico se lo indique. Es posible que necesite esperar hasta que la herida sane. Pregunte cuál pomada debe comprar y la frecuencia con que debe usarla. Después de que la herida sane, use un bloqueador de sol sobre el área cuando se encuentre expuesta al sol. Usted debe hacer esto marnie al menos 6 meses hasta 1 año después de isaak sufrido la lesión.      Acuda a la consulta de control con lawler médico según las indicaciones:Usted tendrá que regresar en un lapso de 3 a 14 días para que le quiten los puntos de sutura o grapas. Anote tessy preguntas para que se acuerde de hacerlas marnie tessy visitas.

## 2021-06-04 NOTE — ED PROVIDER NOTE - OBJECTIVE STATEMENT
45M presents with laceration to left hand. He was cutting a bag with kitchen knife, slipped and cut himself. Tetanus 4 years ago. bleeding controled. c/o pain. no weakness or numbness.

## 2021-06-04 NOTE — ED PROVIDER NOTE - CLINICAL SUMMARY MEDICAL DECISION MAKING FREE TEXT BOX
Alex - adult male with UTD tetanus presents s/p accidental cut with knife. laceration small, wounds already approximated so no stiches needed. bleeding controlled on its own. motor neuro vascular all intact. xr for fb, then dc.

## 2021-06-25 ENCOUNTER — NON-APPOINTMENT (OUTPATIENT)
Age: 46
End: 2021-06-25

## 2021-06-25 ENCOUNTER — APPOINTMENT (OUTPATIENT)
Dept: OPHTHALMOLOGY | Facility: CLINIC | Age: 46
End: 2021-06-25
Payer: COMMERCIAL

## 2021-06-25 PROCEDURE — 92285 EXTERNAL OCULAR PHOTOGRAPHY: CPT

## 2021-06-25 PROCEDURE — 92012 INTRM OPH EXAM EST PATIENT: CPT

## 2021-06-25 PROCEDURE — 99072 ADDL SUPL MATRL&STAF TM PHE: CPT

## 2021-06-29 ENCOUNTER — APPOINTMENT (OUTPATIENT)
Dept: OPHTHALMOLOGY | Facility: CLINIC | Age: 46
End: 2021-06-29
Payer: COMMERCIAL

## 2021-06-29 ENCOUNTER — NON-APPOINTMENT (OUTPATIENT)
Age: 46
End: 2021-06-29

## 2021-06-29 PROCEDURE — 92012 INTRM OPH EXAM EST PATIENT: CPT

## 2021-06-29 PROCEDURE — 99072 ADDL SUPL MATRL&STAF TM PHE: CPT

## 2022-01-04 ENCOUNTER — APPOINTMENT (OUTPATIENT)
Dept: OPHTHALMOLOGY | Facility: CLINIC | Age: 47
End: 2022-01-04

## 2022-02-14 NOTE — ED ADULT TRIAGE NOTE - WEIGHT IN LBS
149.9 normal/airway patent/breath sounds equal/good air movement/respirations non-labored/clear to auscultation bilaterally

## 2022-06-15 NOTE — ED ADULT NURSE NOTE - NS ED NURSE RECORD ANOTHER HT AND WT
Yes
PAST SURGICAL HISTORY:  H/O  section     H/O neck surgery partial right mandibulectomy with fibular free flap reconstriction and right neck lymphnode dissection 22    History of hip replacement left hip    History of partial hysterectomy

## 2022-09-09 NOTE — ED PROVIDER NOTE - CARE PLAN
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 
Principal Discharge DX:	Acute chemical conjunctivitis of left eye

## 2022-12-25 NOTE — ED ADULT NURSE NOTE - NSIMPLEMENTINTERV_GEN_ALL_ED
0730: .Bedside and Verbal shift change report given to Amado Cantrell (oncoming nurse) by [facility] [facility] (offgoing nurse).  Report included the following information SBAR, Kardex, Intake/Output, MAR, Recent Results, and Cardiac Rhythm SR . Implemented All Universal Safety Interventions:  [place] to call system. Call bell, personal items and telephone within reach. Instruct patient to call for assistance. Room bathroom lighting operational. Non-slip footwear when patient is off stretcher. Physically safe environment: no spills, clutter or unnecessary equipment. Stretcher in lowest position, wheels locked, appropriate side rails in place.

## 2023-03-27 NOTE — ED ADULT NURSE NOTE - CINV DISCH MEDS REVIEWED YN
Chief complaint:   Chief Complaint   Patient presents with   • Rash     Rm 1 itchy rash on both arms/elbows started 3/24, spreading to back; hotel/hot tub use 3/22 calamine lotion and anti - itch cream at home       Vitals:  Visit Vitals  /82 (BP Location: LUE - Left upper extremity, Patient Position: Sitting, Cuff Size: Large Adult)   Pulse 60   Temp 96.5 °F (35.8 °C) (Tympanic)   Resp 18   Ht 6' (1.829 m)   Wt 95.3 kg (210 lb)   SpO2 98%   BMI 28.48 kg/m²       HISTORY OF PRESENT ILLNESS     This is a 75-year-old male coming in today with a chief complaint of her rash on his bilateral elbow area that he has had for the past 2-3 days with symptoms staying about the same.  He stated that his symptoms might have been related to getting in a hot tub a few days prior to developing the rash.  He denies any pain but more of an itchy sensation on the area.  He has been applying over-the-counter calamine lotion and anti-itch cream without any significant improvement of his symptoms.  He denies any other associated symptoms.  He has not seen anybody for this.      Other significant problems:  Patient Active Problem List    Diagnosis Date Noted   • Left knee pain 09/26/2019     Priority: Low   • CAD (coronary artery disease) 10/10/2018     Priority: Low     Ca+ score 1034     • Urticaria, idiopathic 12/03/2015     Priority: Low   • Allergic rhinitis 12/03/2015     Priority: Low   • Encounter for long-term (current) use of other medications 06/26/2015     Priority: Low   • Lower extremity pain 11/21/2013     Priority: Low   • Hypertension      Priority: Low   • GERD (gastroesophageal reflux disease)      Priority: Low   • IBS (irritable bowel syndrome)      Priority: Low   • Prediabetes      Priority: Low   • Macular degeneration      Priority: Low   • Migraine      Priority: Low   • Glaucoma 03/14/2013     Priority: Low   • Esophageal stricture 03/14/2013     Priority: Low   • Hiatal hernia 03/14/2013     Priority: Low    • Gout, unspecified 03/14/2013     Priority: Low   • Diverticulosis 03/14/2013     Priority: Low   • Low back pain 03/14/2013     Priority: Low   • Hyperlipidemia      Priority: Low       PAST MEDICAL, FAMILY AND SOCIAL HISTORY     Medications:  Current Outpatient Medications   Medication Sig Dispense Refill   • methylPREDNISolone (MEDROL DOSEPAK) 4 MG tablet follow package directions 1 packet 0   • lisinopril (ZESTRIL) 10 MG tablet Take 1 tablet by mouth daily. 90 tablet 3   • atorvastatin (LIPITOR) 40 MG tablet Take 1 tablet by mouth daily. 90 tablet 3   • pantoprazole (PROTONIX) 40 MG tablet Take 1 tablet by mouth daily. 90 tablet 3   • lidocaine (XYLOCAINE) 5 % ointment      • Probiotic Product (PROBIOTIC ADVANCED PO) Take by mouth daily.     • GLUCOSAMINE CHONDROITIN COMPLX PO Take by mouth daily.     • PREVIDENT 5000 DRY MOUTH 1.1 % dental gel USE NIGHTLY AFTER REGULAR HYGIENE - BRUSH ON TEETH - SPIT OUT EXCESS - DO NOT RINSE  5   • Multiple Vitamins-Minerals (PRESERVISION AREDS 2+MULTI VIT PO)      • MISC NATURAL PRODUCTS PO      • cetirizine (ZYRTEC ALLERGY) 10 MG tablet Take 1 tablet by mouth daily. 30 tablet 3   • aspirin 81 MG tablet Take 1 tablet by mouth daily.       No current facility-administered medications for this visit.       Allergies:  ALLERGIES:  No Known Allergies    Past Medical  History/Surgeries:  Past Medical History:   Diagnosis Date   • CAD (coronary artery disease) 10/10/2018    Ca+ score 1034   • Diverticulosis    • GERD (gastroesophageal reflux disease)    • Glaucoma    • Gout     been 30 years without   • Hiatal hernia    • History of impacted ear wax     gets ear wax removed every 6 weeks    • History of shingles     right leg and foot   • Hyperlipidemia    • Hypertension    • IBS (irritable bowel syndrome)    • Macular degeneration    • Migraine     visual aura   • Prediabetes        Past Surgical History:   Procedure Laterality Date   • Colonoscopy diagnostic  02/25/2008     Colonoscopy, Dx   • Egd dilation of duod w/baloon      has had 7 to 8 times with last time being about    • Eye surgery Bilateral     cataract IOL   • Glaucoma surg,trabecu ab externo Bilateral    • Knee arthroscopy w/ meniscectomy Left 2019    Dr. Rod   • Service to gastroenterology  05/15/2013    colonoscopy   • Shoulder surgery  1970    right shoulder bristol pin placed for chronic dislocation        Family History:  Family History   Problem Relation Age of Onset   • Dementia/Alzheimers Mother         altzheimer's    • Vision Loss Mother         glaucoma   • Aneurysm Sister         brain    • Cancer, Colon Paternal Uncle        Social History:  Social History     Tobacco Use   • Smoking status: Former     Packs/day: 0.00     Years: 20.00     Pack years: 0.00     Types: Cigarettes     Quit date: 1984     Years since quittin.9   • Smokeless tobacco: Never   Substance Use Topics   • Alcohol use: No       REVIEW OF SYSTEMS     Review of Systems   Constitutional: Negative for chills and fever.   Gastrointestinal: Negative for nausea.   Musculoskeletal: Negative for joint swelling.   Skin: Positive for rash. Negative for color change.       PHYSICAL EXAM     Physical Exam  Vitals and nursing note reviewed.   Constitutional:       General: He is awake.      Appearance: He is not ill-appearing, toxic-appearing or diaphoretic.   Skin:     General: Skin is warm.      Capillary Refill: Capillary refill takes less than 2 seconds.      Findings: Rash present.   Neurological:      Mental Status: He is alert.   Psychiatric:         Behavior: Behavior is cooperative.             Symmetric non tender    No discharge    No warmth    ASSESSMENT/PLAN     Nitin was seen today for rash.    Diagnoses and all orders for this visit:    Dermatitis  Comments:  etiology unclear  Orders:  -     methylPREDNISolone (MEDROL DOSEPAK) 4 MG tablet; follow package directions      I discussed with the patient that based on  history and physical examination this does not look infectious.    I advised the patient to have a follow-up visit with the primary care physician for re-evaluation of his rash after 2-3 days    Symptomatic treatment     Close monitoring and follow-up    Final recheck on pt at discharge was reassuring and patient was appropriately stable at time of discharge from urgent care clinic. All questions answered and patient appeared to understand and agree with treatment and discharge plan, including return precautions and follow up plan.     The provisional diagnosis that the patient is discharged with today was based on the history taken, presenting symptoms, physical exam, and/or any ancillary testing. Patient states understanding that often times the diagnosis can change. If new or worsening symptoms occur, patient was instructed to seek immediate medical attention for re-evaluation. See the After Visit Summary for additional instructions, follow-up plans and/or emergency room precautions discussed with the patient.    The following PPE was worn by provider during all interactions and exam with this patient:  [x] gloves, goggles, level 1 procedural mask  [] Level 2 procedural mask  [] gloves, faceshield, N95 mask, gown     No

## 2023-06-14 ENCOUNTER — EMERGENCY (EMERGENCY)
Facility: HOSPITAL | Age: 48
LOS: 1 days | Discharge: HOME | End: 2023-06-14
Attending: EMERGENCY MEDICINE
Payer: COMMERCIAL

## 2023-06-14 VITALS
DIASTOLIC BLOOD PRESSURE: 95 MMHG | TEMPERATURE: 98 F | WEIGHT: 145.06 LBS | HEIGHT: 65 IN | OXYGEN SATURATION: 98 % | RESPIRATION RATE: 18 BRPM | SYSTOLIC BLOOD PRESSURE: 138 MMHG | HEART RATE: 82 BPM

## 2023-06-14 PROCEDURE — 99284 EMERGENCY DEPT VISIT MOD MDM: CPT

## 2023-06-14 PROCEDURE — 99053 MED SERV 10PM-8AM 24 HR FAC: CPT

## 2023-06-14 NOTE — ED ADULT TRIAGE NOTE - WEIGHT IN LBS
PT/OT and  pain management per primary team   S/p L4-S1 laminectomy on 6/15  Spine precautions/LSO brace  F/u with Dr Elzie Buerger upon discharge  145

## 2023-06-15 PROCEDURE — 73610 X-RAY EXAM OF ANKLE: CPT | Mod: 26,RT

## 2023-06-15 PROCEDURE — 73030 X-RAY EXAM OF SHOULDER: CPT

## 2023-06-15 PROCEDURE — 73521 X-RAY EXAM HIPS BI 2 VIEWS: CPT

## 2023-06-15 PROCEDURE — 99284 EMERGENCY DEPT VISIT MOD MDM: CPT

## 2023-06-15 PROCEDURE — 73590 X-RAY EXAM OF LOWER LEG: CPT

## 2023-06-15 PROCEDURE — 73610 X-RAY EXAM OF ANKLE: CPT

## 2023-06-15 PROCEDURE — 73564 X-RAY EXAM KNEE 4 OR MORE: CPT

## 2023-06-15 PROCEDURE — 73564 X-RAY EXAM KNEE 4 OR MORE: CPT | Mod: 26,LT

## 2023-06-15 PROCEDURE — 73521 X-RAY EXAM HIPS BI 2 VIEWS: CPT | Mod: 26

## 2023-06-15 PROCEDURE — 73030 X-RAY EXAM OF SHOULDER: CPT | Mod: 26,RT

## 2023-06-15 PROCEDURE — 96372 THER/PROPH/DIAG INJ SC/IM: CPT

## 2023-06-15 PROCEDURE — 73590 X-RAY EXAM OF LOWER LEG: CPT | Mod: 26,RT

## 2023-06-15 RX ORDER — ACETAMINOPHEN 500 MG
650 TABLET ORAL ONCE
Refills: 0 | Status: COMPLETED | OUTPATIENT
Start: 2023-06-15 | End: 2023-06-15

## 2023-06-15 RX ORDER — KETOROLAC TROMETHAMINE 30 MG/ML
30 SYRINGE (ML) INJECTION ONCE
Refills: 0 | Status: DISCONTINUED | OUTPATIENT
Start: 2023-06-15 | End: 2023-06-15

## 2023-06-15 RX ORDER — METHOCARBAMOL 500 MG/1
1 TABLET, FILM COATED ORAL
Qty: 15 | Refills: 0
Start: 2023-06-15 | End: 2023-06-19

## 2023-06-15 RX ADMIN — Medication 650 MILLIGRAM(S): at 02:53

## 2023-06-15 RX ADMIN — Medication 30 MILLIGRAM(S): at 02:53

## 2023-06-15 NOTE — ED PROVIDER NOTE - PHYSICAL EXAMINATION
Const: AOX3 nontoxic appearing, no apparent respiratory or physical distress. on stretcher NAD   HEENT: NC/AT. Moist mucous membranes.  Eyes: RICARDA. EOMI  Neck: Soft and supple. Full ROM without pain. no mid line TTP   Cardiac: Regular rate and regular rhythm. +S1/S2. No murmurs.   Resp: Speaking in full sentences. No evidence of respiratory distress.   Back: Spine midline and non-tender. No CVAT.  Skin:1cm ana maría abrasion superficial noted on the right distal shin and mild TTP - No deformities noted DP pulse +2 b/L - no right ankle swollen noted no bony TTP and no TTP over ant foot   left knee superior aspect of the patellar mild TTP passive ROM grossly intact    hIp no bony TTP   Neuro: Awake, alert & oriented x 3. Moves all extremities symmetrically. Const: AOX3 nontoxic appearing, no apparent respiratory or physical distress. on stretcher NAD   HEENT: NC/AT. Moist mucous membranes.  Eyes: RICARDA. EOMI  Neck: Soft and supple. Full ROM without pain. no mid line TTP   Cardiac: Regular rate and regular rhythm. +S1/S2. No murmurs.   Resp: Speaking in full sentences. No evidence of respiratory distress.   Back: Spine midline and non-tender. No CVAT.  Skin:1cm oval abrasion superficial noted on the right distal shin and mild TTP - No deformities noted DP pulse +2 b/L - no right ankle swollen noted no bony TTP and no TTP over ant foot   left knee superior aspect of the patellar mild TTP passive ROM grossly intact   right shoulder : ROm grossly intact - radial pulse +2 ROM grossly intact    hIp no bony TTP   Neuro: Awake, alert & oriented x 3. Moves all extremities symmetrically.

## 2023-06-15 NOTE — ED PROVIDER NOTE - OBJECTIVE STATEMENT
47 y.o male Pmh of HTn not on blood tinner present in [place].p fall at work about 10 PM. pt states pt states he tripped  and fell into the edge of a utility trailer,  w.o any head trauma or Loc. was not tried to bear weight . he has abrasion on the right shin and he has pain on right shoulder and right shin and right ankle and left knee as well . pt states he pull the muscle on the hip as well - did not hit or injured the testicular area - last tetanus was less than 10 yrs ago . applied the ice that was helpful / no other injury

## 2023-06-15 NOTE — ED PROVIDER NOTE - NSICDXPASTMEDICALHX_GEN_ALL_CORE_FT
PAST MEDICAL HISTORY:  BPH (benign prostatic hyperplasia)     HTN (hypertension)     HTN (hypertension)

## 2023-06-15 NOTE — ED ADULT NURSE NOTE - OBJECTIVE STATEMENT
Pt. c/o pain s/p trip/fall into the edge of a utility trailer, c/o left knee pain, right shoulder pain, back pain, and right shin pain/bleeding.  Pt. states difficulty ambulating.  Hx. of HTN controlled by lifestyle.

## 2023-06-15 NOTE — ED PROVIDER NOTE - NS ED ATTENDING STATEMENT MOD
This was a shared visit with the ASHLEIGH. I reviewed and verified the documentation and independently performed the documented:

## 2023-06-15 NOTE — ED ADULT NURSE NOTE - NSFALLRISKINTERV_ED_ALL_ED

## 2023-06-15 NOTE — ED PROVIDER NOTE - NSFOLLOWUPINSTRUCTIONS_ED_ALL_ED_FT
Contusion    A contusion is a deep bruise. Contusions are the result of a blunt injury to tissues and muscle fibers under the skin. The skin overlying the contusion may turn blue, purple, or yellow. Symptoms also include pain and swelling in the injured area.    SEEK IMMEDIATE MEDICAL CARE IF YOU HAVE ANY OF THE FOLLOWING SYMPTOMS: severe pain, numbness, tingling, pain, weakness, or skin color/temperature change in any part of your body distal to the injury.  Sprain    A sprain is a stretch or tear in one of the tough, fiber-like tissues (ligaments) in your body. This is caused by an injury to the area such as a twisting mechanism. Symptoms include pain, swelling, or bruising. Rest that area over the next several days and slowly resume activity when tolerated. Ice can help with swelling and pain.     SEEK IMMEDIATE MEDICAL CARE IF YOU HAVE ANY OF THE FOLLOWING SYMPTOMS: worsening pain, inability to move that body part, numbness or tingling. Tylenol alternative Motrin as need it for the pain   Robaxin is muscle relaxant   call and follow up with primary care within 1-2 days   follow up with orthopedic as well    we ill call you back if any changes on the official read the xray   Contusion    A contusion is a deep bruise. Contusions are the result of a blunt injury to tissues and muscle fibers under the skin. The skin overlying the contusion may turn blue, purple, or yellow. Symptoms also include pain and swelling in the injured area.    SEEK IMMEDIATE MEDICAL CARE IF YOU HAVE ANY OF THE FOLLOWING SYMPTOMS: severe pain, numbness, tingling, pain, weakness, or skin color/temperature change in any part of your body distal to the injury.  Sprain    A sprain is a stretch or tear in one of the tough, fiber-like tissues (ligaments) in your body. This is caused by an injury to the area such as a twisting mechanism. Symptoms include pain, swelling, or bruising. Rest that area over the next several days and slowly resume activity when tolerated. Ice can help with swelling and pain.     SEEK IMMEDIATE MEDICAL CARE IF YOU HAVE ANY OF THE FOLLOWING SYMPTOMS: worsening pain, inability to move that body part, numbness or tingling.

## 2023-06-15 NOTE — ED PROVIDER NOTE - CARE PLAN
1 Principal Discharge DX:	Fall  Secondary Diagnosis:	Leg abrasion  Secondary Diagnosis:	Right shoulder pain  Secondary Diagnosis:	Hip pain

## 2023-06-15 NOTE — ED PROVIDER NOTE - PROGRESS NOTE DETAILS
feeling better able to ambulate with cane -   wet read xray w.o any acute finding - explained will call you if any changes on the result   applied bacitracin over right shin and dressing

## 2023-06-15 NOTE — ED PROVIDER NOTE - PATIENT PORTAL LINK FT
You can access the FollowMyHealth Patient Portal offered by [facility] by registering at the following website: [email or web address]. By joining [employer]’s FollowMyHealth portal, you will also be able to view your health information using other applications (apps) compatible with our system.

## 2023-06-15 NOTE — ED PROVIDER NOTE - CARE PROVIDER_API CALL
Jacinto Layton  Orthopaedic Surgery  [address] [place] [id number]  Phone: [phone]  Fax: [phone]  Follow Up Time:

## 2023-06-15 NOTE — ED PROVIDER NOTE - ATTENDING APP SHARED VISIT CONTRIBUTION OF CARE
48 yo M s/p trip and fall c/o pain to R lower leg, L knee and R shoulder, no head injury, LOC or neck pain.  Will check x-rays, medicate for pain, update Td as needed and re-eval

## 2023-06-15 NOTE — ED PROVIDER NOTE - CLINICAL SUMMARY MEDICAL DECISION MAKING FREE TEXT BOX
47 y.o male Pmh of HTn not on blood tinner present in [place]p fall at work about 10 PM. pt states pt states he tripped  and fell into the edge of a utility trailer,  w.o any head trauma or Loc. was not tried to bear weight . he has abrasion on the right shin and he has pain on right shoulder and right shin and right ankle and left knee as well . pt states he pull the muscle on the hip as well - did not hit or injured the testicular area - last tetanus was less than 10 yrs ago . applied the ice that was helpful / no other injury   pain control - tylenol - toradol- wound care - xray right shoulder and right tibfib/ ankle and lef knee and pelvis

## 2023-07-10 ENCOUNTER — APPOINTMENT (OUTPATIENT)
Dept: ORTHOPEDIC SURGERY | Facility: CLINIC | Age: 48
End: 2023-07-10

## 2024-01-29 NOTE — ED PROVIDER NOTE - CRITICAL CARE PROVIDED
Patient discharged per orders, all instructions reviewed with patient and patient verbalized understanding.  IV discontinued x 1, Tele box discontinued x 1.  Patient transferred to wheelchair x 1, all belongings by patients side.  Patient's daughter to transport her home.     consult w/ pt's family directly relating to pts condition/documentation/interpretation of diagnostic studies/consultation with other physicians/direct patient care (not related to procedure)/additional history taking

## 2024-03-07 NOTE — ED PROVIDER NOTE - CHPI ED SYMPTOMS NEG
Assessment/Plan:    No problem-specific Assessment & Plan notes found for this encounter.       {Assess/PlanSmartLinks:45154}      Subjective:      Patient ID: Dae Lira is a 59 y.o. male.    HPI      Review of Systems      Objective:      There were no vitals taken for this visit.         Physical Exam       no blurred vision

## 2024-05-28 ENCOUNTER — APPOINTMENT (OUTPATIENT)
Dept: UROLOGY | Facility: CLINIC | Age: 49
End: 2024-05-28
Payer: COMMERCIAL

## 2024-05-28 VITALS
DIASTOLIC BLOOD PRESSURE: 86 MMHG | BODY MASS INDEX: 24.99 KG/M2 | HEIGHT: 65 IN | WEIGHT: 150 LBS | SYSTOLIC BLOOD PRESSURE: 150 MMHG

## 2024-05-28 DIAGNOSIS — N52.9 MALE ERECTILE DYSFUNCTION, UNSPECIFIED: ICD-10-CM

## 2024-05-28 PROCEDURE — 99203 OFFICE O/P NEW LOW 30 MIN: CPT

## 2024-05-28 RX ORDER — TADALAFIL 5 MG/1
5 TABLET ORAL
Qty: 30 | Refills: 3 | Status: ACTIVE | COMMUNITY
Start: 2024-05-28 | End: 1900-01-01

## 2024-05-28 NOTE — ASSESSMENT
[FreeTextEntry1] : 48M smoker w no PMH presents for ED and low libido. He has mild ED but did not tolerate low-dose sildenafil previously. We had a long discussion about management options and agreed to focus on lifestyle modification and try low-dose tadalafil. We will also test testosterone level.  -UA/UCx today -PSA, testosterone -Tadalafil 5 mg PRN -RTC 1M

## 2024-05-28 NOTE — HISTORY OF PRESENT ILLNESS
[FreeTextEntry1] : 48M smoker w no PMH presents for ED and low libido.  2021, tried sildenafil 20 mg PRN. This helped with the ED but caused facial flushing and rhinitis.  Today, PVR 63 ml. He denies LUTS. He has mild ED. He reports worsening libido.

## 2024-05-28 NOTE — PHYSICAL EXAM
[General Appearance - Well Developed] : well developed [General Appearance - Well Nourished] : well nourished [Normal Appearance] : normal appearance [Well Groomed] : well groomed [Edema] : no peripheral edema [] : no respiratory distress [Abdomen Soft] : soft [Urethral Meatus] : meatus normal [Penis Abnormality] : normal uncircumcised penis [Testes Mass (___cm)] : there were no testicular masses [Normal Station and Gait] : the gait and station were normal for the patient's age [Skin Color & Pigmentation] : normal skin color and pigmentation [No Focal Deficits] : no focal deficits [Oriented To Time, Place, And Person] : oriented to person, place, and time [Affect] : the affect was normal [Mood] : the mood was normal [Not Anxious] : not anxious

## 2024-05-29 LAB
APPEARANCE: CLEAR
BILIRUBIN URINE: NEGATIVE
BLOOD URINE: NEGATIVE
COLOR: YELLOW
GLUCOSE QUALITATIVE U: NEGATIVE MG/DL
KETONES URINE: NEGATIVE MG/DL
LEUKOCYTE ESTERASE URINE: NEGATIVE
NITRITE URINE: NEGATIVE
PH URINE: 7.5
PROTEIN URINE: NEGATIVE MG/DL
SPECIFIC GRAVITY URINE: 1.01
UROBILINOGEN URINE: 0.2 MG/DL

## 2024-06-11 RX ORDER — TADALAFIL 5 MG/1
5 TABLET ORAL
Qty: 90 | Refills: 3 | Status: ACTIVE | COMMUNITY
Start: 2024-06-11 | End: 1900-01-01

## 2024-06-25 ENCOUNTER — APPOINTMENT (OUTPATIENT)
Dept: UROLOGY | Facility: CLINIC | Age: 49
End: 2024-06-25
Payer: COMMERCIAL

## 2024-06-25 ENCOUNTER — TRANSCRIPTION ENCOUNTER (OUTPATIENT)
Age: 49
End: 2024-06-25

## 2024-06-25 PROCEDURE — 99213 OFFICE O/P EST LOW 20 MIN: CPT

## 2024-06-25 RX ORDER — TADALAFIL 5 MG/1
5 TABLET ORAL
Qty: 90 | Refills: 3 | Status: ACTIVE | COMMUNITY
Start: 2024-06-25 | End: 1900-01-01

## 2024-08-13 NOTE — ED ADULT NURSE REASSESSMENT NOTE - NS ED NURSE REASSESS COMMENT FT1
12mg of Adenosine given IV.
150 of Amniodarone given IV.
6mg of Adenosine given IV.
6mg of Adenosine given iv
Pt states he is feeling "a little more relaxed and feels like his heart rate is slowing down. " Wife remains at bedside. will continue to monitor pt.
Skin normal color for race, warm, dry and intact. No evidence of rash.

## 2024-10-22 NOTE — ED PROVIDER NOTE - PRO INTERPRETER NEED 2
Samples Given: Cerave hydrating cleanser to wash the face twice daily, CeraVe AM moisturizer, CeraVe moisturizing lotion
Detail Level: Zone
English
Render In Strict Bullet Format?: No

## 2025-02-17 NOTE — ED PROVIDER NOTE - INTERNATIONAL TRAVEL
[facility] EMERGENCY DEPARTMENT  eMERGENCY dEPARTMENT eNCOUnter   Attending Attestation     Pt Name: Gerry Monge  MRN: [id number]  Birthdate 2003  Date of evaluation: 2/16/25       Gerry Monge is a 21 y.o. male who presents with Cough, Nasal Congestion, and Vomiting      History:   Patient is here for couple day of cough and pain congestion and vomiting.    Exam: Vitals:   Vitals:    02/16/25 1811 02/16/25 1813   BP:  (!) 145/68   Pulse: 75    Resp: 18    Temp: 98.2 °F (36.8 °C)    SpO2: 96%    Weight:  (!) 137.4 kg (303 lb)   Height: 1.753 m (5' 9\")      Heart rate regular rate rhythm no murmurs.  Lungs clear to auscultation bilaterally.    I performed a history and physical examination of the patient and discussed management with the resident. I reviewed the resident’s note and agree with the documented findings and plan of care. Any areas of disagreement are noted on the chart. I was personally present for the key portions of any procedures. I have documented in the chart those procedures where I was not present during the key portions. I have personally reviewed all images and agree with the resident's interpretation. I have reviewed the emergency nurses triage note. I agree with the chief complaint, past medical history, past surgical history, allergies, medications, social and family history as documented unless otherwise noted below. Documentation of the HPI, Physical Exam and Medical Decision Making performed by medical students or scribes is based on my personal performance of the HPI, PE and MDM. I personally evaluated and examined the patient in conjunction with the APC and agree with the assessment, treatment plan, and disposition of the patient as recorded by the APC. Additional findings are as noted.    Juan Alberto Tejada MD  Attending Emergency  Physician              Juan Alberto Tejada MD  02/16/25 1933     No

## 2025-03-25 NOTE — ED ADULT TRIAGE NOTE - CCCP TRG CHIEF CMPLNT
HOSPITALIST PROGRESS NOTE    Patient: Michael Vasquez Date: 3/24/2025   male, 73 year old  Admit Date: 3/23/2025   Attending: Shoshana Estrada DO        Subjective:  Patient was seen and examined at the bedside.  He is sitting up in the bed today, awake and interactive. He reports feeling much better today.  Notes that he ambulated with therapy and is no longer tachycardic, HR now back to his normal range.  He also reports that nausea is resolved.  He has not have a bowel movement since admission, thus no signs of bleeding.  Again notes that he thinks restarting on aspirin recently may have provoked bleeding.  Hgb dropped to 6.4 overnight, transfused 1 unit PRBC's.    Objective:  Labs:  Recent Labs   Lab 03/24/25  1550 03/24/25  0945 03/24/25  0352 03/23/25  1650 03/23/25  0857 03/19/25  1247   WBC  --   --  2.3*  --  3.6* 2.6*   RBC  --   --  2.53*  --  2.58* 3.03*   HGB 7.6* 8.3* 7.8*   < > 7.9* 9.3*   HCT  --   --  23.5*  --  23.7* 27.9*   PLT  --   --  175  --  218 179   SEG  --   --   --   --  81 67    < > = values in this interval not displayed.     Recent Labs   Lab 03/24/25  0352 03/23/25  0857   SODIUM 144 145   POTASSIUM 3.8 3.6   CHLORIDE 113* 115*   CO2 27 23   BUN 21* 24*   CREATININE 1.14 1.13   GLUCOSE 90 106*   CALCIUM 8.2* 8.7   ALBUMIN  --  2.9*   AST  --  35   GPT  --  16   BILIRUBIN  --  0.4   ALKPT  --  31*       Vital signs:  BP (!) 146/56 (BP Location: RUE - Right upper extremity, Patient Position: Semi-Gunderson's)   Pulse (!) 59   Temp 98.7 °F (37.1 °C) (Oral)   Resp 16   Ht 5' 8\" (1.727 m)   Wt 76.3 kg (168 lb 3.4 oz)   BMI 25.58 kg/m²   BSA 1.9 m²      Physical Exam:  General: 74 yo  male, alert, interactive, pleasant, chronically ill-appearing  Eyes:  EOMI, pupils equal and reactive to light  HENT: normocephalic, atraumatic, no cervical lymphadenopathy, + dry lips and oral mucosa  Cardiac: RRR, S1, S2, no audible murmurs  Lungs:  CTA b/l, breathing comfortably on RA, no  wheezes or crackles  Abdomen:  soft, nontender, nondistended, no palpable masses, normoactive bowel sounds x 4  Extremities:  no gross deformity, no peripheral edema  Skin:  warm, pink, no open wounds present  Neuro:  alert and oriented x 3, speech is fluent, tongue protrudes midline, 5/5 muscle strength throughout, sensation is grossly intact to soft touch    Assessment/Plan:  Acute on chronic normocytic anemia, symptomatic  -presented with nausea, decreased oral intake, dark stools x 2-3 days  -h/o iron deficiency anemia & bleeding AVM's  -hgb down to 7.9 at admission -> dropped to 6.4 overnight (9.3 just 4 days ago)  -unclear etiology - possible recurrent GI bleeding given recent dark stools & positive stool occult  -no ferritin/B12/folate deficiency  -GI consulted - planned for EGD with single balloon enteroscopy tomrw 3/25/25  -will continue IV protonix 40mg bid  -will continue to monitor hgb q 6hrs  -will plan to transfuse for hgb < 7.0 or hemodynamic instability     Hypomagnesemia  -Mg 1.8 today  -will monitor bmp and mg in the am     HTN  -improving; -140's  -will continue PTA clonidine 0.2mg tid, hydralazine 100mg tid, losartan 50mg bid, minoxidil 10mg daily and isordil 10mg tid     HLD  -will continue PTA rosuvastatin 40mg daily     Renal artery stenosis  -s/p bilateral renal artery stenting     H/o melanoma  -s/p amputation of the L great toe   -no chemotherapy or radiation txt      MGUS  -follows outpatient with hematology/oncology      Diet:  clear liquid  DVT prophylaxis:  hold pharmacologic anticoagulation due to concern for GI bleeding  Code status:  FULL    Dispo:  Not medically stable for discharge.  Planned for EGD tomrw.      Dr. Shoshana Estrada, DO  Internal Medicine / Hospitalist  03/24/25       lower leg pain/injury
